# Patient Record
Sex: FEMALE | Race: BLACK OR AFRICAN AMERICAN | Employment: FULL TIME | URBAN - METROPOLITAN AREA
[De-identification: names, ages, dates, MRNs, and addresses within clinical notes are randomized per-mention and may not be internally consistent; named-entity substitution may affect disease eponyms.]

---

## 2017-01-17 ENCOUNTER — OFFICE VISIT (OUTPATIENT)
Dept: INTERNAL MEDICINE CLINIC | Age: 37
End: 2017-01-17

## 2017-01-17 VITALS
DIASTOLIC BLOOD PRESSURE: 81 MMHG | RESPIRATION RATE: 18 BRPM | HEART RATE: 82 BPM | HEIGHT: 67 IN | SYSTOLIC BLOOD PRESSURE: 140 MMHG | WEIGHT: 278 LBS | OXYGEN SATURATION: 98 % | TEMPERATURE: 98.5 F | BODY MASS INDEX: 43.63 KG/M2

## 2017-01-17 DIAGNOSIS — H57.89 EYE REDNESS: ICD-10-CM

## 2017-01-17 DIAGNOSIS — G89.29 CHRONIC BILATERAL LOW BACK PAIN WITHOUT SCIATICA: ICD-10-CM

## 2017-01-17 DIAGNOSIS — R07.9 CHEST PAIN, UNSPECIFIED TYPE: ICD-10-CM

## 2017-01-17 DIAGNOSIS — Z00.00 ROUTINE PHYSICAL EXAMINATION: Primary | ICD-10-CM

## 2017-01-17 DIAGNOSIS — H57.12 EYE PAIN, LEFT: ICD-10-CM

## 2017-01-17 DIAGNOSIS — H54.7 DECREASED VISUAL ACUITY: ICD-10-CM

## 2017-01-17 DIAGNOSIS — M54.50 CHRONIC BILATERAL LOW BACK PAIN WITHOUT SCIATICA: ICD-10-CM

## 2017-01-17 DIAGNOSIS — E66.01 MORBID OBESITY WITH BMI OF 40.0-44.9, ADULT (HCC): ICD-10-CM

## 2017-01-17 LAB
BILIRUB UR QL STRIP: NEGATIVE
GLUCOSE UR-MCNC: NEGATIVE MG/DL
KETONES P FAST UR STRIP-MCNC: NEGATIVE MG/DL
PH UR STRIP: 6 [PH] (ref 4.6–8)
PROT UR QL STRIP: NEGATIVE MG/DL
SP GR UR STRIP: 1.01 (ref 1–1.03)
UA UROBILINOGEN AMB POC: NORMAL (ref 0.2–1)
URINALYSIS CLARITY POC: CLEAR
URINALYSIS COLOR POC: YELLOW
URINE BLOOD POC: NEGATIVE
URINE LEUKOCYTES POC: NEGATIVE
URINE NITRITES POC: NEGATIVE

## 2017-01-17 NOTE — MR AVS SNAPSHOT
Visit Information Date & Time Provider Department Dept. Phone Encounter #  
 1/17/2017  1:30 PM Brigitte Almonte MD [facility] [phone] Upcoming Health Maintenance Date Due  
 PAP AKA CERVICAL CYTOLOGY 1/31/2017* DTaP/Tdap/Td series (2 - Td) 1/17/2027 *Topic was postponed. The date shown is not the original due date. Allergies as of 1/17/2017  Review Complete On: 1/17/2017 By: Joey Elkins Severity Noted Reaction Type Reactions Hydrocodone  01/17/2017    Itching Current Immunizations  Never Reviewed No immunizations on file. Not reviewed this visit You Were Diagnosed With   
  
 Codes Comments Routine physical examination    -  Primary ICD-10-CM: Z00.00 ICD-9-CM: V70.0 Chest pain, unspecified type     ICD-10-CM: R07.9 ICD-9-CM: 786.50 Eye redness     ICD-10-CM: H57.8 ICD-9-CM: 379.93 Eye pain, left     ICD-10-CM: H57.12 
ICD-9-CM: 379.91 Decreased visual acuity     ICD-10-CM: H54.7 ICD-9-CM: 369.9 Morbid obesity with BMI of 40.0-44.9, adult (HCC)     ICD-10-CM: E66.01, Z68.41 
ICD-9-CM: 278.01, V85.41 Vitals BP Pulse Temp Resp Height(growth percentile) Weight(growth percentile) 140/81 (BP 1 Location: Left arm, BP Patient Position: Sitting) 82 98.5 °F (36.9 °C) (Oral) 18 5' 7\" (1.702 m) 278 lb (126.1 kg) LMP SpO2 BMI Smoking Status 12/27/2016 98% 43.54 kg/m2 Never Smoker Vitals History BMI and BSA Data Body Mass Index Body Surface Area 43.54 kg/m 2 2.44 m 2 Preferred Pharmacy Pharmacy Name Phone [facility] [address] [place] [address],  [address] Your Updated Medication List  
  
Notice  As of 1/17/2017  2:45 PM  
 You have not been prescribed any medications. We Performed the Following AMB POC EKG ROUTINE W/ 12 LEADS, INTER & REP [29410 CPT(R)] AMB POC URINALYSIS DIP STICK AUTO W/O MICRO [82930 CPT(R)] REFERRAL TO OPHTHALMOLOGY [REF57 Custom] Comments:  
 Please evaluate patient for left eye redness, pain, and visual disturbance. To-Do List   
 01/17/2017 Lab:  CBC WITH AUTOMATED DIFF   
  
 01/17/2017 Lab:  LIPID PANEL   
  
 01/17/2017 Lab:  METABOLIC PANEL, COMPREHENSIVE Referral Information Referral ID Referred By Referred To  
  
 [id number] ASIYA ARGUELLO III Not Available Visits Status Start Date End Date 1 New Request 1/17/17 1/17/18 If your referral has a status of pending review or denied, additional information will be sent to support the outcome of this decision. Introducing [facility] HE[facility] SERVICES! Jarrell King introduces [employer] patient portal. Now you can access parts of your medical record, email your doctor's office, and request medication refills online. 1. In your internet browser, go to [email or web address] 2. Click on the First Time User? Click Here link in the Sign In box. You will see the New Member Sign Up page. 3. Enter your [employer] Access Code exactly as it appears below. You will not need to use this code after youve completed the sign-up process. If you do not sign up before the expiration date, you must request a new code. · [employer] Access Code: [email or web address] Expires: 4/17/2017  1:28 PM 
 
4. Enter the last four digits of your Social Security Number (xxxx) and Date of Birth (mm/dd/yyyy) as indicated and click Submit. You will be taken to the next sign-up page. 5. Create a [employer] ID. This will be your Compario login ID and cannot be changed, so think of one that is secure and easy to remember. 6. Create a Compario password. You can change your password at any time. 7. Enter your Password Reset Question and Answer. This can be used at a later time if you forget your password. 8. Enter your e-mail address.  You will receive e-mail notification when new information is available in [employer]. 9. Click Sign Up. You can now view and download portions of your medical record. 10. Click the Download Summary menu link to download a portable copy of your medical information. If you have questions, please visit the Frequently Asked Questions section of the [employer] website. Remember, [employer] is NOT to be used for urgent needs. For medical emergencies, dial 911. Now available from your iPhone and Android! Please provide this summary of care documentation to your next provider. Your primary care clinician is listed as Mary Spaulding If you have any questions after today's visit, please call [phone].

## 2017-01-17 NOTE — PROGRESS NOTES
History:   CC: Eye redness and pain    HPI: Samuel Mesa is a 39 y.o. female presenting today for an initial visit to establish care c/o left eye redness and pain. Pt also requests routine physical.    Eye redness and pain: Pt c/o Left eye pain and redness that began this morning. She states that pain has progressed since onset. There is associated blurred vision of the affected eye. She denies history of trauma, discharge, photophobia, or foreign body sensation. She does wear corrective lenses. She has never used contact lenses. She has not attempted to treat her symptoms. Low back pain:  Symptoms have been present for approximately 6 months. Pt reports aching, non-radiating pain of the lumbar spine. 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 Symptoms are aggravated with lifting objects and actions requiring flexion of the spine. Symptoms are alleviated with spine extension and rest. Today pain level is 5 out of 10. She denies lower extremity weakness/numbness, bowel/bladder incontinence or saddle anesthesia. She was prescribed robaxin in the past with minimal improvement. History of chest discomfort: Pt reports approximately 2-3 episodes of left-sided chest discomfort over the past several months. The last episode was one week ago. Pain is described as sharp, throbbing, non radiating, lasting approximately 3 minutes. Symptoms occurred at rest.  There is no associated SOB, cough, dyspnea on exertion, orthopnea, pnd, or leg swelling. Patient is able to reproduce the sensation by pressing on her chest.          History reviewed. No pertinent past medical history. History reviewed. No pertinent past surgical history. Social History     Social History    Marital status:      Spouse name: N/A    Number of children: N/A    Years of education: N/A     Occupational History    Not on file.      Social History Main Topics    Smoking status: Never Smoker    Smokeless tobacco: Never Used    Alcohol use No    Drug use: No    Sexual activity: Yes     Partners: Female     Birth control/ protection: None     Other Topics Concern    Not on file     Social History Narrative    No narrative on file       Family History   Problem Relation Age of Onset    Diabetes Mother     Hypertension Mother     Asthma Maternal Aunt     Alzheimer Maternal Grandmother     Diabetes Maternal Grandfather        No current outpatient prescriptions on file prior to visit. No current facility-administered medications on file prior to visit. Allergies   Allergen Reactions    Hydrocodone Itching       Review of Systems   Constitutional: Negative. HENT: Negative. Eyes: Positive for blurred vision, pain and redness. Negative for double vision, photophobia and discharge. Respiratory: Negative. Cardiovascular: Positive for chest pain. Negative for palpitations, orthopnea, claudication, leg swelling and PND. Gastrointestinal: Negative. Genitourinary: Negative. Musculoskeletal: Negative. Skin: Negative. Neurological: Negative. Endo/Heme/Allergies: Negative. Objective:   VS:    Visit Vitals    /81 (BP 1 Location: Left arm, BP Patient Position: Sitting)    Pulse 82    Temp 98.5 °F (36.9 °C) (Oral)    Resp 18    Ht 5' 7\" (1.702 m)    Wt 278 lb (126.1 kg)    LMP 12/27/2016    SpO2 98%    BMI 43.54 kg/m2     Physical Exam   Constitutional: She is oriented to person, place, and time. She appears well-developed and well-nourished. obese   HENT:   Head: Normocephalic and atraumatic. Right Ear: External ear normal.   Left Ear: External ear normal.   Nose: Nose normal.   Mouth/Throat: Oropharynx is clear and moist.   Eyes: EOM and lids are normal. Pupils are equal, round, and reactive to light. Right eye exhibits no exudate and no hordeolum. No foreign body present in the right eye. Left eye exhibits no exudate and no hordeolum.  No foreign body present in the left eye. Right conjunctiva is not injected. Left conjunctiva is injected. Left conjunctiva has no hemorrhage. No scleral icterus. Neck: Normal range of motion. Neck supple. Cardiovascular: Normal rate, regular rhythm, normal heart sounds and intact distal pulses. Pulmonary/Chest: Effort normal and breath sounds normal. No respiratory distress. Abdominal: Soft. Bowel sounds are normal.   Musculoskeletal:   Chest: TTP at left border of sternum    Lumbar spine: FROM, no midline tenderness, no paraspinal tenderness    Muscle strength 5/5 in upper and lower extremities bilaterally   Neurological: She is alert and oriented to person, place, and time. Skin: Skin is warm and dry. Psychiatric: She has a normal mood and affect. Nursing note and vitals reviewed. Assessment/ Plan:     Maco Wolf was seen today for red eye and back pain. Diagnoses and all orders for this visit:    Routine physical examination  -     CBC WITH AUTOMATED DIFF; Future  -     METABOLIC PANEL, COMPREHENSIVE; Future  -     LIPID PANEL; Future  -     AMB POC URINALYSIS DIP STICK AUTO W/O MICRO    Eye redness  -     REFERRAL TO OPHTHALMOLOGY    Eye pain, left  -     REFERRAL TO OPHTHALMOLOGY    Decreased visual acuity  -     REFERRAL TO OPHTHALMOLOGY    Chest pain, unspecified type        -     Pain reproducible on exam, appears to be of musculoskeletal etiology  -     AMB POC EKG ROUTINE W/ 12 LEADS, INTER & REP demonstrates low voltage complexes and nonspecific t wave changes. likely due to enlarged body habitus    Morbid obesity with BMI of 40.0-44.9, adult ([address]        -     Counseled on lifestyle modification with diet and exercise    Chronic low back pain        -     REFERRAL TO PT              I have discussed the diagnosis with the patient and the intended plan as seen in the above orders. The patient verbalized understanding and agrees with the plan.       Follow-up Disposition: Not on Mana Paul MD

## 2017-01-17 NOTE — PROGRESS NOTES
Chief Complaint   Patient presents with    Red Eye     left, pt states she woke up this morning with her eye red     Back Pain     pt states she lifts at work      1. Have you been to the ER, urgent care clinic since your last visit? Hospitalized since your last visit? No    2. Have you seen or consulted any other health care providers outside of the [address] [place] [address] since your last visit? Include any pap smears or colon screening.  new to office

## 2017-01-27 ENCOUNTER — TELEPHONE (OUTPATIENT)
Dept: INTERNAL MEDICINE CLINIC | Age: 37
End: 2017-01-27

## 2017-01-30 ENCOUNTER — TELEPHONE (OUTPATIENT)
Dept: INTERNAL MEDICINE CLINIC | Age: 37
End: 2017-01-30

## 2017-01-30 NOTE — TELEPHONE ENCOUNTER
Reviewed labs with patient. Informed that CBC and metabolic panel looked good. Cholesterol was slightly elevated. Counseled on lifestyle modification. Pt expressed understanding and was in agreement with treatment plan.

## 2017-02-02 DIAGNOSIS — Z00.00 ROUTINE PHYSICAL EXAMINATION: ICD-10-CM

## 2017-06-22 ENCOUNTER — OFFICE VISIT (OUTPATIENT)
Dept: INTERNAL MEDICINE CLINIC | Age: 37
End: 2017-06-22

## 2017-06-22 VITALS
OXYGEN SATURATION: 98 % | BODY MASS INDEX: 44.17 KG/M2 | HEIGHT: 67 IN | DIASTOLIC BLOOD PRESSURE: 80 MMHG | RESPIRATION RATE: 18 BRPM | SYSTOLIC BLOOD PRESSURE: 118 MMHG | WEIGHT: 281.4 LBS | HEART RATE: 98 BPM | TEMPERATURE: 98.4 F

## 2017-06-22 DIAGNOSIS — Z87.898 H/O URINARY FREQUENCY: Primary | ICD-10-CM

## 2017-06-22 DIAGNOSIS — G89.29 CHRONIC LEFT-SIDED LOW BACK PAIN WITHOUT SCIATICA: ICD-10-CM

## 2017-06-22 DIAGNOSIS — M54.50 CHRONIC LEFT-SIDED LOW BACK PAIN WITHOUT SCIATICA: ICD-10-CM

## 2017-06-22 LAB
BILIRUB UR QL STRIP: NEGATIVE
GLUCOSE UR-MCNC: NEGATIVE MG/DL
KETONES P FAST UR STRIP-MCNC: NEGATIVE MG/DL
PH UR STRIP: 7 [PH] (ref 4.6–8)
PROT UR QL STRIP: NEGATIVE MG/DL
SP GR UR STRIP: 1.02 (ref 1–1.03)
UA UROBILINOGEN AMB POC: NORMAL (ref 0.2–1)
URINALYSIS CLARITY POC: CLEAR
URINALYSIS COLOR POC: YELLOW
URINE BLOOD POC: NEGATIVE
URINE LEUKOCYTES POC: NEGATIVE
URINE NITRITES POC: NEGATIVE

## 2017-06-22 RX ORDER — CHOLECALCIFEROL (VITAMIN D3) 125 MCG
1 CAPSULE ORAL AS NEEDED
COMMUNITY

## 2017-06-22 NOTE — MR AVS SNAPSHOT
Visit Information Date & Time Provider Department Dept. Phone Encounter #  
 6/22/2017  1:30 PM Clay Hamman, MD [facility] [phone] [id number] Follow-up Instructions Return for follow up of elevated BMI and hyperlipidemia. Upcoming Health Maintenance Date Due  
 PAP AKA CERVICAL CYTOLOGY 8/29/2000 INFLUENZA AGE 9 TO ADULT 8/1/2017 DTaP/Tdap/Td series (2 - Td) 1/17/2027 Allergies as of 6/22/2017  Review Complete On: 6/22/2017 By: Deng Garcia LPN Severity Noted Reaction Type Reactions Hydrocodone  01/17/2017    Itching Current Immunizations  Never Reviewed No immunizations on file. Not reviewed this visit You Were Diagnosed With   
  
 Codes Comments BMI 40.0-44.9, adult [facility])    -  Primary ICD-10-CM: Z68.41 
ICD-9-CM: V85.41 Chronic left-sided low back pain without sciatica     ICD-10-CM: M54.5, G89.29 ICD-9-CM: 724.2, 338.29 Vitals BP Pulse Temp Resp Height(growth percentile) Weight(growth percentile) 118/80 (BP 1 Location: Right arm, BP Patient Position: Sitting) 98 98.4 °F (36.9 °C) (Oral) 18 5' 7\" (1.702 m) 281 lb 6.4 oz (127.6 kg) LMP SpO2 BMI OB Status Smoking Status 06/03/2017 (Exact Date) 98% 44.07 kg/m2 Having regular periods Never Smoker Vitals History BMI and BSA Data Body Mass Index Body Surface Area 44.07 kg/m 2 2.46 m 2 Preferred Pharmacy Pharmacy Name Phone [facility] [address], [address] Your Updated Medication List  
  
   
This list is accurate as of: 6/22/17  1:51 PM.  Always use your most recent med list.  
  
  
  
  
 ALEVE 220 mg Cap Generic drug:  naproxen sodium Take 1 Cap by mouth as needed. Follow-up Instructions Return for follow up of elevated BMI and hyperlipidemia. Introducing [facility] & HE[facility]! Jessi Acharya introduces [employer] patient portal. Now you can access parts of your medical record, email your doctor's office, and request medication refills online. 1. In your internet browser, go to [email or web address] 2. Click on the First Time User? Click Here link in the Sign In box. You will see the New Member Sign Up page. 3. Enter your [employer] Access Code exactly as it appears below. You will not need to use this code after youve completed the sign-up process. If you do not sign up before the expiration date, you must request a new code. · [employer] Access Code: [email or web address] Expires: 9/20/2017  1:16 PM 
 
4. Enter the last four digits of your Social Security Number (xxxx) and Date of Birth (mm/dd/yyyy) as indicated and click Submit. You will be taken to the next sign-up page. 5. Create a [employer] ID. This will be your [employer] login ID and cannot be changed, so think of one that is secure and easy to remember. 6. Create a [employer] password. You can change your password at any time. 7. Enter your Password Reset Question and Answer. This can be used at a later time if you forget your password. 8. Enter your e-mail address. You will receive e-mail notification when new information is available in [address] 9. Click Sign Up. You can now view and download portions of your medical record. 10. Click the Download Summary menu link to download a portable copy of your medical information. If you have questions, please visit the Frequently Asked Questions section of the [employer] website. Remember, [employer] is NOT to be used for urgent needs. For medical emergencies, dial 911. Now available from your iPhone and Android! Please provide this summary of care documentation to your next provider. Your primary care clinician is listed as Mary Spaulding If you have any questions after today's visit, please call [phone].

## 2017-06-22 NOTE — PROGRESS NOTES
Kimberley Barnes is a 39 y.o. female presents in office with c/o low back pain and blood tinged urine. Unsure if she may have passed a kidney stone. Health Maintenance Due   Topic Date Due    PAP AKA CERVICAL CYTOLOGY  08/29/2000       1. Have you been to the ER, urgent care clinic since your last visit? Hospitalized since your last visit? No    2. Have you seen or consulted any other health care providers outside of the [address] [place] [address] since your last visit? Include any pap smears or colon screening.  No

## 2017-06-22 NOTE — PROGRESS NOTES
Subjective:   Patient is a 39y.o. year old female who presents for follow up of LOW BACK PAIN    Urinary frequency:  Pt reports that one week ago she experienced 2 days of urinary frequency, nausea w/out vomiting, mild left flank pain, and possibly pink-tinged urine, w/out gross hematuria. There was no associated dysuria, urgency, or vaginal discharge. Pt reports history of kidney stone several years ago. She is asymptomatic today. She denies fever/chills, n/v, flank, or abdominal pain. Chronic low back pain: Pt reports left-sided low back pain that is increased with movement. 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 She reports symptoms as mild today. Pain is 3/10 and non-radiating. She denies lower extremity weakness, tingling/numbness, bowel or bladder dysfunction. Review of Systems   Constitutional: Negative for chills and fever. HENT: Negative. Respiratory: Negative. Cardiovascular: Negative. Gastrointestinal: Negative. Genitourinary: Negative for dysuria, flank pain, frequency, hematuria and urgency. Musculoskeletal: Positive for back pain. Skin: Negative. Neurological: Negative. Endo/Heme/Allergies: Negative. No current outpatient prescriptions on file prior to visit. No current facility-administered medications on file prior to visit. Reviewed PmHx, RxHx, FmHx, SocHx, AllgHx and updated and dated in the chart. Nurse notes were reviewed and are correct    Objective:     Vitals:    06/22/17 1312   BP: 118/80   Pulse: 98   Resp: 18   Temp: 98.4 °F (36.9 °C)   TempSrc: Oral   SpO2: 98%   Weight: 281 lb 6.4 oz (127.6 kg)   Height: 5' 7\" (1.702 m)     Physical Exam   Constitutional: She is oriented to person, place, and time. She appears well-developed and well-nourished. HENT:   Head: Normocephalic and atraumatic. Cardiovascular: Normal rate and regular rhythm.     Pulmonary/Chest: Effort normal and breath sounds normal.   Abdominal: Soft. Bowel sounds are normal.   Musculoskeletal: She exhibits no edema. Lumbar spine: no edema, no erythema, FROM, no midline tenderness, paraspinal muscles mildly TTP on left    Neurological: She is alert and oriented to person, place, and time. Nursing note and vitals reviewed. Assessment/ Plan:     Debbie Knight was seen today for low back pain. Diagnoses and all orders for this visit:    H/O urinary frequency        -     Pt asymptomatic today, urine dip is normal        -     Advised to increase water intake and monitor symptoms  -     AMB POC URINALYSIS DIP STICK MANUAL W/O MICRO - negative  -     RTC if symptoms should return    Chronic left-sided low back pain without sciatica        -     Stable        -     No worsening of symptoms        -     Fair control with OTC pain reliever            BMI 40.0-44.9, adult (HCC)        -     Continue efforts with lifestyle modification with healthy diet and regular exercise       I have discussed the diagnosis with the patient and the intended plan as seen in the above orders. The patient verbalized understanding and agrees with the plan.     Follow-up Disposition: Not on [facility] III, MD

## 2018-04-14 ENCOUNTER — APPOINTMENT (OUTPATIENT)
Dept: CT IMAGING | Age: 38
End: 2018-04-14
Attending: EMERGENCY MEDICINE
Payer: COMMERCIAL

## 2018-04-14 ENCOUNTER — HOSPITAL ENCOUNTER (EMERGENCY)
Age: 38
Discharge: HOME | End: 2018-04-14
Attending: EMERGENCY MEDICINE | Admitting: EMERGENCY MEDICINE
Payer: COMMERCIAL

## 2018-04-14 VITALS
HEIGHT: 66 IN | TEMPERATURE: 98.2 F | OXYGEN SATURATION: 100 % | SYSTOLIC BLOOD PRESSURE: 152 MMHG | HEART RATE: 87 BPM | WEIGHT: 260 LBS | BODY MASS INDEX: 41.78 KG/M2 | DIASTOLIC BLOOD PRESSURE: 100 MMHG | RESPIRATION RATE: 18 BRPM

## 2018-04-14 DIAGNOSIS — G43.109 MIGRAINE WITH AURA AND WITHOUT STATUS MIGRAINOSUS, NOT INTRACTABLE: Primary | ICD-10-CM

## 2018-04-14 LAB
ALBUMIN SERPL-MCNC: 3.7 G/DL (ref 3.4–5)
ALBUMIN/GLOB SERPL: 0.8 {RATIO} (ref 0.8–1.7)
ALP SERPL-CCNC: 71 U/L (ref 45–117)
ALT SERPL-CCNC: 26 U/L (ref 13–56)
ANION GAP SERPL CALC-SCNC: 8 MMOL/L (ref 3–18)
AST SERPL-CCNC: 26 U/L (ref 15–37)
BASOPHILS # BLD: 0 K/UL (ref 0–0.06)
BASOPHILS NFR BLD: 0 % (ref 0–2)
BILIRUB SERPL-MCNC: 0.4 MG/DL (ref 0.2–1)
BUN SERPL-MCNC: 13 MG/DL (ref 7–18)
BUN/CREAT SERPL: 14 (ref 12–20)
CALCIUM SERPL-MCNC: 8.8 MG/DL (ref 8.5–10.1)
CHLORIDE SERPL-SCNC: 105 MMOL/L (ref 100–108)
CO2 SERPL-SCNC: 23 MMOL/L (ref 21–32)
CREAT SERPL-MCNC: 0.93 MG/DL (ref 0.6–1.3)
DIFFERENTIAL METHOD BLD: ABNORMAL
EOSINOPHIL # BLD: 0.1 K/UL (ref 0–0.4)
EOSINOPHIL NFR BLD: 2 % (ref 0–5)
ERYTHROCYTE [DISTWIDTH] IN BLOOD BY AUTOMATED COUNT: 15 % (ref 11.6–14.5)
GLOBULIN SER CALC-MCNC: 4.7 G/DL (ref 2–4)
GLUCOSE SERPL-MCNC: 74 MG/DL (ref 74–99)
HCG UR QL: NEGATIVE
HCT VFR BLD AUTO: 39.4 % (ref 35–45)
HGB BLD-MCNC: 12.9 G/DL (ref 12–16)
LYMPHOCYTES # BLD: 2.2 K/UL (ref 0.9–3.6)
LYMPHOCYTES NFR BLD: 48 % (ref 21–52)
MCH RBC QN AUTO: 25.6 PG (ref 24–34)
MCHC RBC AUTO-ENTMCNC: 32.7 G/DL (ref 31–37)
MCV RBC AUTO: 78.3 FL (ref 74–97)
MONOCYTES # BLD: 0.3 K/UL (ref 0.05–1.2)
MONOCYTES NFR BLD: 7 % (ref 3–10)
NEUTS SEG # BLD: 1.9 K/UL (ref 1.8–8)
NEUTS SEG NFR BLD: 43 % (ref 40–73)
PLATELET # BLD AUTO: 266 K/UL (ref 135–420)
PMV BLD AUTO: 9.8 FL (ref 9.2–11.8)
POTASSIUM SERPL-SCNC: 4.5 MMOL/L (ref 3.5–5.5)
PROT SERPL-MCNC: 8.4 G/DL (ref 6.4–8.2)
RBC # BLD AUTO: 5.03 M/UL (ref 4.2–5.3)
SODIUM SERPL-SCNC: 136 MMOL/L (ref 136–145)
WBC # BLD AUTO: 4.5 K/UL (ref 4.6–13.2)

## 2018-04-14 PROCEDURE — 80053 COMPREHEN METABOLIC PANEL: CPT | Performed by: EMERGENCY MEDICINE

## 2018-04-14 PROCEDURE — 99282 EMERGENCY DEPT VISIT SF MDM: CPT

## 2018-04-14 PROCEDURE — 85025 COMPLETE CBC W/AUTO DIFF WBC: CPT | Performed by: EMERGENCY MEDICINE

## 2018-04-14 PROCEDURE — 70450 CT HEAD/BRAIN W/O DYE: CPT

## 2018-04-14 PROCEDURE — 81025 URINE PREGNANCY TEST: CPT

## 2018-04-14 NOTE — LETTER
[facility] EMERGENCY DEPT 
[address] [place] [id number] 
[phone] Work/School Note Date: 4/14/2018 To Whom It May concern: 
 
Rakesh Denny was seen and treated today in the emergency room by the following provider(s): 
Attending Provider: Allan Vieyra MD.   
 
Rakesh Denny may return to work on April 15, 2018. Sincerely, Allan Vieyra MD

## 2018-04-14 NOTE — ED PROVIDER NOTES
EMERGENCY DEPARTMENT HISTORY AND PHYSICAL EXAM    12:41 PM      Date: 4/14/2018  Patient Name: Jose Sood    History of Presenting Illness     Chief Complaint   Patient presents with    Vision Change         History Provided By: Patient    Chief Complaint: Vision Change   Duration: 2 Hours  Timing:  Acute, resolved   Location: Both eyes  Quality: Blurry and bright   Severity: Moderate  Modifying Factors: Pt tried blinking both eyes and there was no relief   Associated Symptoms: headache      Additional History (Context): Jose Sood is a 44 y.o. female with no PMHx who presents with c/o acute, resolved, moderate, blurry and bright vision change in the right and left eyes that occurred 2 hours ago with associated mild headache that occurred after her vision change and lasted 10 minutes. Pt states while sitting down at work there was acute onset of blurry, bright, an double vision that lasted about 30 minutes. Pt states she had no peripheral vision during this episode as well. Pt tried blinking both eyes and there was no relief. She notes that she saw the doctor at work and was told that it was a problem with her iron. She states that this has never happened to her before. Pt has no vision problems at the moment or headache. Pt has FHx of migraines from grandmother. Pt denies head trauma, syncope, changes in speech, difficulty walking, nausea, or dizziness. Pt denies smoking and drinking. Denies any further complaints or symptoms at the moment. PCP: Leidy Celaya MD    Current Outpatient Prescriptions   Medication Sig Dispense Refill    naproxen sodium (ALEVE) 220 mg cap Take 1 Cap by mouth as needed. Past History     Past Medical History:  History reviewed. No pertinent past medical history. Past Surgical History:  History reviewed. No pertinent surgical history.     Family History:  Family History   Problem Relation Age of Onset    Diabetes Mother     Hypertension Mother     Asthma Maternal Aunt     Alzheimer Maternal Grandmother     Diabetes Maternal Grandfather        Social History:  Social History   Substance Use Topics    Smoking status: Never Smoker    Smokeless tobacco: Never Used    Alcohol use No       Allergies: Allergies   Allergen Reactions    Hydrocodone Itching         Review of Systems       Review of Systems   Eyes: Positive for visual disturbance. Gastrointestinal: Negative for nausea. Musculoskeletal: Negative for gait problem. Neurological: Positive for headaches. Negative for dizziness, syncope and speech difficulty. All other systems reviewed and are negative. Physical Exam     Visit Vitals    BP (!) 152/100 (BP 1 Location: Right arm, BP Patient Position: At rest)    Pulse 87    Temp 98.2 °F (36.8 °C)    Resp 18    Ht 5' 6\" (1.676 m)    Wt 117.9 kg (260 lb)    SpO2 100%    BMI 41.97 kg/m2         Physical Exam   Constitutional: She is oriented to person, place, and time. She appears well-developed and well-nourished. No distress. HENT:   Head: Normocephalic and atraumatic. Mouth/Throat: Oropharynx is clear and moist.   Eyes: Conjunctivae and EOM are normal. Pupils are equal, round, and reactive to light. No scleral icterus. Neck: Normal range of motion. Neck supple. Cardiovascular: Normal rate, regular rhythm and normal heart sounds. No murmur heard. Pulmonary/Chest: Effort normal and breath sounds normal. No respiratory distress. Abdominal: Soft. Bowel sounds are normal. She exhibits no distension. There is no tenderness. Musculoskeletal: She exhibits no edema. Lymphadenopathy:     She has no cervical adenopathy. Neurological: She is alert and oriented to person, place, and time. She has normal strength. No cranial nerve deficit or sensory deficit. Coordination and gait normal.   Skin: Skin is warm and dry. No rash noted. Psychiatric: She has a normal mood and affect.  Her behavior is normal.   Nursing note and vitals reviewed. Diagnostic Study Results     Labs -  Recent Results (from the past 12 hour(s))   HCG URINE, QL. - POC    Collection Time: 04/14/18  1:59 PM   Result Value Ref Range    Pregnancy test,urine (POC) NEGATIVE  NEG     CBC WITH AUTOMATED DIFF    Collection Time: 04/14/18  2:00 PM   Result Value Ref Range    WBC 4.5 (L) 4.6 - 13.2 K/uL    RBC 5.03 4.20 - 5.30 M/uL    HGB 12.9 12.0 - 16.0 g/dL    HCT 39.4 35.0 - 45.0 %    MCV 78.3 74.0 - 97.0 FL    MCH 25.6 24.0 - 34.0 PG    MCHC 32.7 31.0 - 37.0 g/dL    RDW 15.0 (H) 11.6 - 14.5 %    PLATELET 346 526 - 196 K/uL    MPV 9.8 9.2 - 11.8 FL    NEUTROPHILS 43 40 - 73 %    LYMPHOCYTES 48 21 - 52 %    MONOCYTES 7 3 - 10 %    EOSINOPHILS 2 0 - 5 %    BASOPHILS 0 0 - 2 %    ABS. NEUTROPHILS 1.9 1.8 - 8.0 K/UL    ABS. LYMPHOCYTES 2.2 0.9 - 3.6 K/UL    ABS. MONOCYTES 0.3 0.05 - 1.2 K/UL    ABS. EOSINOPHILS 0.1 0.0 - 0.4 K/UL    ABS. BASOPHILS 0.0 0.0 - 0.06 K/UL    DF AUTOMATED     METABOLIC PANEL, COMPREHENSIVE    Collection Time: 04/14/18  2:00 PM   Result Value Ref Range    Sodium 136 136 - 145 mmol/L    Potassium 4.5 3.5 - 5.5 mmol/L    Chloride 105 100 - 108 mmol/L    CO2 23 21 - 32 mmol/L    Anion gap 8 3.0 - 18 mmol/L    Glucose 74 74 - 99 mg/dL    BUN 13 7.0 - 18 MG/DL    Creatinine 0.93 0.6 - 1.3 MG/DL    BUN/Creatinine ratio 14 12 - 20      GFR est AA >60 >60 ml/min/1.73m2    GFR est non-AA >60 >60 ml/min/1.73m2    Calcium 8.8 8.5 - 10.1 MG/DL    Bilirubin, total 0.4 0.2 - 1.0 MG/DL    ALT (SGPT) 26 13 - 56 U/L    AST (SGOT) 26 15 - 37 U/L    Alk. phosphatase 71 45 - 117 U/L    Protein, total 8.4 (H) 6.4 - 8.2 g/dL    Albumin 3.7 3.4 - 5.0 g/dL    Globulin 4.7 (H) 2.0 - 4.0 g/dL    A-G Ratio 0.8 0.8 - 1.7         Radiologic Studies -   CT HEAD WO CONT        Findings  No intracranial abnormality          Medical Decision Making   I am the first provider for this patient.     I reviewed the vital signs, available nursing notes, past medical history, past surgical history, family history and social history. Vital Signs-Reviewed the patient's vital signs. Pulse Oximetry Analysis -  100 % on room air, WNL    Records Reviewed: Nursing Notes and Old Medical Records (Time of Review: 12:41 PM)    ED Course: Progress Notes, Reevaluation, and Consults:    3:11 PM Patient has improved, she states that she does not have a headache. Provider Notes (Medical Decision Making):   MDM  Number of Diagnoses or Management Options  Migraine with aura and without status migrainosus, not intractable:   Diagnosis management comments: Transient change in vision bright lights with scotoma followed by transient headache now back to baseline denies further neuro changes       Pt stable in ED Ct and labs reviewed will dc home        Amount and/or Complexity of Data Reviewed  Clinical lab tests: ordered and reviewed  Tests in the radiology section of CPT®: ordered and reviewed        Diagnosis     Clinical Impression:   1.  Migraine with aura and without status migrainosus, not intractable        Disposition: Discharged    Follow-up Information     Follow up With Details Comments Contact Info    [address] [place] [address] EMERGENCY DEPT  As needed, If symptoms worsen [address] [place] [address] R[address] III, MD Schedule an appointment as soon as possible for a visit for Ed follow up appointment  [address] [address]  [phone]             Discharge Medication List as of 4/14/2018  3:11 PM      CONTINUE these medications which have NOT CHANGED    Details   naproxen sodium (ALEVE) 220 mg cap Take 1 Cap by mouth as needed., Historical Med           _______________________________    Attestations:  Robson Kirby 89 acting as a scribe for and in the presence of Elian Dooley MD      April 14, 2018 at 12:41 PM           Philly Ken acting as a scribe for and in the presence of Elian Dooley MD      April 14, 2018 at 1:13 PM       Provider Attestation:      I personally performed the services described in the documentation, reviewed the documentation, as recorded by the scribe in my presence, and it accurately and completely records my words and actions.  April 14, 2018 at 12:41 PM - Elian Dooley MD    _______________________________

## 2018-04-14 NOTE — ED TRIAGE NOTES
Pt c/o blurry vision and brightness approx 1.5 hours ago, for 30 minutes. Feels normal now. 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

## 2018-04-14 NOTE — DISCHARGE INSTRUCTIONS
Migraine Aura Without a Headache: Care Instructions  Your Care Instructions    A migraine aura without a headache is a type of migraine. When you have an aura, you may see spots, wavy lines, or flashing lights. Your hands, arms, or face may tingle or feel numb. But unlike other migraines, a headache doesn't follow the aura. Some people have both types of migraines. Although they sometimes have an aura without the headache, at other times they may get a headache after an aura. Without treatment, migraines can last from 4 hours to a few days. Medicines can help prevent migraines or stop them once they have started. Your doctor can help you find which ones work best for you. Follow-up care is a key part of your treatment and safety. Be sure to make and go to all appointments, and call your doctor if you are having problems. It's also a good idea to know your test results and keep a list of the medicines you take. How can you care for yourself at home? · Do not drive if you have taken a prescription pain medicine. · Rest in a quiet, dark room until your aura or headache is gone. Close your eyes. Try to relax or go to sleep. Don't watch TV or read. · If you get a headache, put a cold, moist cloth or cold pack on the painful area for 10 to 20 minutes at a time. Put a thin cloth between the cold pack and your skin. · Use a warm, moist towel or a heating pad set on low. This can relax tight shoulder and neck muscles. · Have someone gently massage your neck and shoulders. · Be safe with medicines. Take your medicines exactly as prescribed. Call your doctor if you think you are having a problem with your medicine. You will get more details on the specific medicines your doctor prescribes. · Be careful not to take medicine more often than the instructions allow. This may cause worse or more frequent auras or headaches when the medicine wears off.   To prevent migraines  · Keep a diary so you can figure out what triggers your auras or headaches. Avoiding triggers may help you prevent migraines. Record when each aura or headache began, how long it lasted, and what the symptoms were like. Write down any other symptoms you had with the aura. These may include nausea or sensitivity to bright light or loud noise. Note if the aura or headache occurred near your period. List anything that might have triggered the aura. Triggers may include certain foods (chocolate, cheese, wine) or odors, smoke, bright light, stress, or lack of sleep. · If your doctor has prescribed medicine for your migraines, take it as directed. You may have medicine that you take only when you get a migraine and medicine that you take all the time to help prevent migraines. ¨ If your doctor has prescribed medicine for when you get migraines, take it at the first sign of an aura, unless your doctor has given you other instructions. ¨ If your doctor has prescribed medicine to prevent migraines, take it exactly as prescribed. Call your doctor if you think you are having a problem with your medicine. · Find healthy ways to deal with stress. Migraines are most common during or right after stressful times. Take time to relax before and after you do something that has caused a migraine in the past.  · Get plenty of sleep and exercise. · Eat regular meals, and avoid foods and drinks that often trigger migraines. These include chocolate and alcohol, especially red wine and port. Chemicals used in food, such as aspartame and monosodium glutamate (MSG), also can trigger migraines. So can some food additives, such as those found in hot dogs, reed, cold cuts, aged cheeses, and pickled foods. · Limit caffeine by not drinking too much coffee, tea, or soda. But don't quit caffeine suddenly, because that can also give you migraines. · Do not smoke or allow others to smoke around you.  If you need help quitting, talk to your doctor about stop-smoking programs and medicines. These can increase your chances of quitting for good. · If you are taking birth control pills or hormone therapy, talk to your doctor about whether they are triggering your migraines. When should you call for help? Call 911 anytime you think you may need emergency care. For example, call if:  ? · You have symptoms of a stroke. These may include:  ¨ Sudden numbness, tingling, weakness, or loss of movement in your face, arm, or leg, especially on only one side of your body. ¨ Sudden vision changes. ¨ Sudden trouble speaking. ¨ Sudden confusion or trouble understanding simple statements. ¨ Sudden problems with walking or balance. ¨ A sudden, severe headache that is different from past headaches. ?Call your doctor now or seek immediate medical care if:  ? · You have new or worse nausea and vomiting. ? · You have a new or higher fever. ? · Your headache gets much worse. ? Watch closely for changes in your health, and be sure to contact your doctor if:  ? · You are not getting better after 2 days (48 hours). Where can you learn more? Go to http://amy-owen.info/. Enter 415 20 [id number] in the search box to learn more about \"Migraine Aura Without a Headache: Care Instructions. \"  Current as of: October 14, 2016  Content Version: 11.4  © 4023-9333 [employer]. Care instructions adapted under license by [employer] (which disclaims liability or warranty for this information). If you have questions about a medical condition or this instruction, always ask your healthcare professional. [place] [address] any warranty or liability for your use of this information. Migraine Headache: Care Instructions  Your Care Instructions  Migraines are painful, throbbing headaches that often start on one side of the head. They may cause nausea and vomiting and make you sensitive to light, sound, or smell.   Without treatment, migraines can last from 4 hours to a few days. Medicines can help prevent migraines or stop them after they have started. Your doctor can help you find which ones work best for you. Follow-up care is a key part of your treatment and safety. Be sure to make and go to all appointments, and call your doctor if you are having problems. It's also a good idea to know your test results and keep a list of the medicines you take. How can you care for yourself at home? · Do not drive if you have taken a prescription pain medicine. · Rest in a quiet, dark room until your headache is gone. Close your eyes, and try to relax or go to sleep. Don't watch TV or read. · Put a cold, moist cloth or cold pack on the painful area for 10 to 20 minutes at a time. Put a thin cloth between the cold pack and your skin. · Use a warm, moist towel or a heating pad set on low to relax tight shoulder and neck muscles. · Have someone gently massage your neck and shoulders. · Take your medicines exactly as prescribed. Call your doctor if you think you are having a problem with your medicine. You will get more details on the specific medicines your doctor prescribes. · Be careful not to take pain medicine more often than the instructions allow. You could get worse or more frequent headaches when the medicine wears off. To prevent migraines  · Keep a headache diary so you can figure out what triggers your headaches. Avoiding triggers may help you prevent headaches. Record when each headache began, how long it lasted, and what the pain was like. (Was it throbbing, aching, stabbing, or dull?) Write down any other symptoms you had with the headache, such as nausea, flashing lights or dark spots, or sensitivity to bright light or loud noise. Note if the headache occurred near your period. List anything that might have triggered the headache. Triggers may include certain foods (chocolate, cheese, wine) or odors, smoke, bright light, stress, or lack of sleep.   · If your doctor has prescribed medicine for your migraines, take it as directed. You may have medicine that you take only when you get a migraine and medicine that you take all the time to help prevent migraines. ¨ If your doctor has prescribed medicine for when you get a headache, take it at the first sign of a migraine, unless your doctor has given you other instructions. ¨ If your doctor has prescribed medicine to prevent migraines, take it exactly as prescribed. Call your doctor if you think you are having a problem with your medicine. · Find healthy ways to deal with stress. Migraines are most common during or right after stressful times. Take time to relax before and after you do something that has caused a migraine in the past.  · Try to keep your muscles relaxed by keeping good posture. Check your jaw, face, neck, and shoulder muscles for tension. Try to relax them. When you sit at a desk, change positions often. And make sure to stretch for 30 seconds each hour. · Get plenty of sleep and exercise. · Eat meals on a regular schedule. Avoid foods and drinks that often trigger migraines. These include chocolate, alcohol (especially red wine and port), aspartame, monosodium glutamate (MSG), and some additives found in foods (such as hot dogs, reed, cold cuts, aged cheeses, and pickled foods). · Limit caffeine. Don't drink too much coffee, tea, or soda. But don't quit caffeine suddenly. That can also give you migraines. · Do not smoke or allow others to smoke around you. If you need help quitting, talk to your doctor about stop-smoking programs and medicines. These can increase your chances of quitting for good. · If you are taking birth control pills or hormone therapy, talk to your doctor about whether they are triggering your migraines. When should you call for help? Call 911 anytime you think you may need emergency care. For example, call if:  ? · You have signs of a stroke.  These may include:  ¨ Sudden numbness, paralysis, or weakness in your face, arm, or leg, especially on only one side of your body. ¨ Sudden vision changes. ¨ Sudden trouble speaking. ¨ Sudden confusion or trouble understanding simple statements. ¨ Sudden problems with walking or balance. ¨ A sudden, severe headache that is different from past headaches. ?Call your doctor now or seek immediate medical care if:  ? · You have new or worse nausea and vomiting. ? · You have a new or higher fever. ? · Your headache gets much worse. ? Watch closely for changes in your health, and be sure to contact your doctor if:  ? · You are not getting better after 2 days (48 hours). Where can you learn more? Go to http://amy-owen.info/. Enter J787 in the search box to learn more about \"Migraine Headache: Care Instructions. \"  Current as of: October 14, 2016  Content Version: 11.4  © 2345-2843 [employer]. Care instructions adapted under license by [employer] (which disclaims liability or warranty for this information). If you have questions about a medical condition or this instruction, always ask your healthcare professional. [place] [address] any warranty or liability for your use of this information.